# Patient Record
Sex: MALE | Race: WHITE | Employment: UNEMPLOYED | URBAN - NONMETROPOLITAN AREA
[De-identification: names, ages, dates, MRNs, and addresses within clinical notes are randomized per-mention and may not be internally consistent; named-entity substitution may affect disease eponyms.]

---

## 2020-09-20 ENCOUNTER — HOSPITAL ENCOUNTER (INPATIENT)
Age: 25
LOS: 4 days | Discharge: HOME | DRG: 753 | End: 2020-09-24
Attending: PSYCHIATRY & NEUROLOGY | Admitting: PSYCHIATRY & NEUROLOGY
Payer: COMMERCIAL

## 2020-09-20 PROCEDURE — 1240000000 HC EMOTIONAL WELLNESS R&B

## 2020-09-20 RX ORDER — POLYETHYLENE GLYCOL 3350 17 G/17G
17 POWDER, FOR SOLUTION ORAL DAILY PRN
Status: DISCONTINUED | OUTPATIENT
Start: 2020-09-20 | End: 2020-09-24 | Stop reason: HOSPADM

## 2020-09-20 RX ORDER — ACETAMINOPHEN 325 MG/1
650 TABLET ORAL EVERY 4 HOURS PRN
Status: DISCONTINUED | OUTPATIENT
Start: 2020-09-20 | End: 2020-09-24 | Stop reason: HOSPADM

## 2020-09-20 ASSESSMENT — SLEEP AND FATIGUE QUESTIONNAIRES
DO YOU USE A SLEEP AID: NO
DO YOU HAVE DIFFICULTY SLEEPING: NO
AVERAGE NUMBER OF SLEEP HOURS: 14

## 2020-09-20 ASSESSMENT — LIFESTYLE VARIABLES: HISTORY_ALCOHOL_USE: YES

## 2020-09-20 ASSESSMENT — PATIENT HEALTH QUESTIONNAIRE - PHQ9: SUM OF ALL RESPONSES TO PHQ QUESTIONS 1-9: 14

## 2020-09-20 NOTE — PROGRESS NOTES
BHI Admission From ED  Nursing Admission Note              There is no problem list on file for this patient. Pt transferred from 100 [facility], [address] [facility] hospital care to Adult [facility] room 0625A/625A-01. Arrived on unit via [facility] with . Pt  attired in street clothes. Body assessment and safety search completed by Azra Zuleta with no contraband discovered. All tubes, lines, and drains were appropriately discontinued by ED staff prior to pt transfer to [facility]. Pt belongings and valuables inventoried and cataloged, stored per policy. Pt oriented to surroundings, program expectations, and copy pt rights given. Received admit packet: [facility], Visitation Info, Fall Prevention, Restraints Info. Consents reviewed, signed Pt Rights, Handbook Acceptance, Visit/Call Acceptance, PHI Release, Social Info Release, and Treatment Agreement. Pt verbalizes understanding. Pt is a smoker? no Pt offered Nicotine patch yes,   Pt refused Nicotine patch? Yes. Identifies stressors.  Financial and social.         C/o:    Notes:

## 2020-09-21 PROBLEM — F40.10 SOCIAL ANXIETY DISORDER: Status: ACTIVE | Noted: 2020-09-21

## 2020-09-21 PROBLEM — F31.9 BIPOLAR DEPRESSION (HCC): Status: ACTIVE | Noted: 2020-09-21

## 2020-09-21 PROCEDURE — 1240000000 HC EMOTIONAL WELLNESS R&B

## 2020-09-21 PROCEDURE — 6370000000 HC RX 637 (ALT 250 FOR IP): Performed by: NURSE PRACTITIONER

## 2020-09-21 PROCEDURE — 90792 PSYCH DIAG EVAL W/MED SRVCS: CPT | Performed by: NURSE PRACTITIONER

## 2020-09-21 RX ORDER — VENLAFAXINE HYDROCHLORIDE 75 MG/1
75 CAPSULE, EXTENDED RELEASE ORAL
Status: DISCONTINUED | OUTPATIENT
Start: 2020-09-22 | End: 2020-09-21

## 2020-09-21 RX ORDER — LEVOTHYROXINE SODIUM 0.05 MG/1
50 TABLET ORAL
COMMUNITY
Start: 2020-02-17

## 2020-09-21 RX ORDER — DIVALPROEX SODIUM 500 MG/1
500 TABLET, DELAYED RELEASE ORAL 2 TIMES DAILY
COMMUNITY

## 2020-09-21 RX ORDER — LEVOTHYROXINE SODIUM 0.05 MG/1
50 TABLET ORAL
Status: DISCONTINUED | OUTPATIENT
Start: 2020-09-22 | End: 2020-09-24 | Stop reason: HOSPADM

## 2020-09-21 RX ORDER — ESCITALOPRAM OXALATE 20 MG/1
20 TABLET ORAL DAILY
Status: ON HOLD | COMMUNITY
End: 2020-09-24 | Stop reason: HOSPADM

## 2020-09-21 RX ORDER — VENLAFAXINE HYDROCHLORIDE 37.5 MG/1
37.5 CAPSULE, EXTENDED RELEASE ORAL
Status: DISCONTINUED | OUTPATIENT
Start: 2020-09-22 | End: 2020-09-23

## 2020-09-21 RX ORDER — DIVALPROEX SODIUM 500 MG/1
500 TABLET, DELAYED RELEASE ORAL 2 TIMES DAILY
Status: DISCONTINUED | OUTPATIENT
Start: 2020-09-21 | End: 2020-09-24 | Stop reason: HOSPADM

## 2020-09-21 RX ORDER — BUSPIRONE HYDROCHLORIDE 15 MG/1
30 TABLET ORAL 2 TIMES DAILY
Status: DISCONTINUED | OUTPATIENT
Start: 2020-09-21 | End: 2020-09-24 | Stop reason: HOSPADM

## 2020-09-21 RX ORDER — BUSPIRONE HYDROCHLORIDE 15 MG/1
30 TABLET ORAL 2 TIMES DAILY
COMMUNITY

## 2020-09-21 RX ADMIN — LURASIDONE HYDROCHLORIDE 120 MG: 40 TABLET, FILM COATED ORAL at 13:37

## 2020-09-21 RX ADMIN — BUSPIRONE HYDROCHLORIDE 30 MG: 15 TABLET ORAL at 21:16

## 2020-09-21 RX ADMIN — BUSPIRONE HYDROCHLORIDE 30 MG: 15 TABLET ORAL at 13:37

## 2020-09-21 RX ADMIN — DIVALPROEX SODIUM 500 MG: 500 TABLET, DELAYED RELEASE ORAL at 13:37

## 2020-09-21 RX ADMIN — DIVALPROEX SODIUM 500 MG: 500 TABLET, DELAYED RELEASE ORAL at 21:16

## 2020-09-21 NOTE — PLAN OF CARE
Problem: Health Maintenance - Impaired:  Goal: Able to sleep without medication for appropriate length of time  9/21/2020 1626 by John Saavedra  Outcome: Ongoing      Group Therapy Note     Date: 9/21/2020  Start Time: 3138  End Time:  1600  Number of Participants: 6     Type of Group: Recovery     Wellness Binder Information  Module Name:  relapse prevention  Session Number:  2     Patient's Goal:  identifying early warning signs     Notes:  pt acknowledged negative thinking as an early warning sign to help prevent relapse.      Status After Intervention:  Improved     Participation Level: Interactive     Participation Quality: Appropriate, Attentive, and Sharing        Speech:  normal        Thought Process/Content: Logical        Affective Functioning: Congruent        Mood: congruent        Level of consciousness:  Alert, Oriented x4, and Attentive        Response to Learning: Able to verbalize current knowledge/experience        Endings: None Reported     Modes of Intervention: Education        Discipline Responsible: Psychoeducational Specialist        Signature:  John Saavedra

## 2020-09-21 NOTE — PROGRESS NOTES
Group Therapy Note        Date: 9/20/20  Start Time: 2000  End Time:  2130    Wrap-up Group Session:  Patient did not participate in wrap-up group session, nurse notified and staff attempted to encourage patient participation without success.       Discipline Responsible: Behavioral Health Tech      Signature:  Dulce Tao
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
## 2020-09-21 NOTE — PROGRESS NOTES
Group Therapy Note    Start Time: 990  End Time:  594  Number of Participants: 10    Type of Group: Community Meeting       Patient's Goal:  depression      Notes:      Participation Level:  Active Listener       Participation Quality: Appropriate      Thought Process/Content: Logical      Affective Functioning: Congruent      Mood: calm      Level of consciousness:  Alert      Modes of Intervention: Support      Discipline Responsible: Behavioral Health Tech II      Signature:  Sallie Dancer

## 2020-09-21 NOTE — PLAN OF CARE
Group Therapy Note    Date: 9/21/2020  Start Time: 1000  End Time:  4738  Number of Participants: 9    Type of Group: Psychoeducation    Wellness Binder Information  Module Name:  Men's Issues  Session Number:  1    Group Goal for Pt: To improve knowledge of effective stress management techniques    Notes:  Pt demonstrated improved knowledge of effective stress management techniques by actively participating in group discussion. Status After Intervention:  Unchanged    Participation Level:  Active Listener and Interactive    Participation Quality: Appropriate and Attentive      Speech:  normal      Thought Process/Content: Logical      Affective Functioning: Congruent      Mood: anxious and depressed      Level of consciousness:  Alert and Oriented x4      Response to Learning: Able to verbalize current knowledge/experience, Able to verbalize/acknowledge new learning, and Progressing to goal      Endings: None Reported    Modes of Intervention: Education      Discipline Responsible: Psychoeducational Specialist      Signature:  Michelle Natarajan

## 2020-09-21 NOTE — PLAN OF CARE
Problem: Suicide risk  Goal: Provide patient with safe environment  9/21/2020 1619 by Shedrick Baumgarten  Outcome: Ongoing   Group Therapy Note     Date: 9/21/2020  Start Time: 1430  End Time:  4416  Number of Participants: 6     Type of Group: Cognitive Skills     Wellness Binder Information  Module Name:  emotional wellness  Session Number:  4     Patient's Goal:  self-esteem     Notes:  pt acknowledged focusing on positive to help improve self-esteem.      Status After Intervention:  Improved     Participation Level: Interactive     Participation Quality: Appropriate, Attentive, and Sharing        Speech:  normal        Thought Process/Content: Logical        Affective Functioning: Congruent        Mood: congruent        Level of consciousness:  Alert, Oriented x4, and Attentive        Response to Learning: Able to verbalize current knowledge/experience        Endings: None Reported     Modes of Intervention: Education        Discipline Responsible: Psychoeducational Specialist        Signature:  Shedrick Baumgarten

## 2020-09-21 NOTE — PROGRESS NOTES
Treatment Team Note:    MARK met with [address] team to discuss Pts [address] plans. Progress/Behavior/Group Attendance: TBD    Target Symptoms/Reason for admission: Patient admitted to [facility] due to Pt transferred from [address] [facility] [address] hospital care to [address] [place] [address] room 0625A/625A-01. Arrived on unit via [facility] with Security escort. Pt  attired in street clothes. Body assessment and safety search completed by Milan with no contraband discovered. All tubes, lines, and drains were appropriately discontinued by ED staff prior to pt transfer to [facility]    Diagnoses: Depression NOS    UDS: N/A     BAL: N/A    AftercarePlan: [address] lives with: MARK will meet with pt to gather information. Collateral obtained from: SW will meet with pt to gather release of information.   On:    Family Session: DAVION    Misc:

## 2020-09-21 NOTE — PROGRESS NOTES
Requirement Note     SW met with pt to complete Psychosocial and CSSR-S on this date. Patients long and short term goals discussed. Pt voiced understanding. Treatment plan sheet signed. Pt verbalized understanding of the treatment plan. Pt participated in goals and objectives of the treatment plan. Pt completed safety plan with , pt received copy of plan, and original was placed into pt's chart. SW explained treatment goals with pt. Decreasing depression and anxiety by improvement of positive coping patterns was discussed. Pt acknowledged understanding of treatment goals and signed treatment plan signature sheet. In the last 6 months has the pt been a danger to self: YES  In the last 6 months has the pt been a danger to others: NO  Legal Guardian/POA: NO     Provided pt with [employer] handout entitled \"Quitting Smoking. \"  Reviewed handout with pt addressing dangers of smoking, developing coping skills, and providing basic information about quitting. Patient declined practical counseling of tobacco practical counseling during the hospital stay.

## 2020-09-21 NOTE — PROGRESS NOTES
Admission Note      Reason for admission/Target Symptom: Patient admitted to [facility] due to Pt transferred from [facility], [address] [facility] care to Adult [facility] room 0625A/625A-01. Arrived on unit via [facility] with . Pt  attired in street clothes. Body assessment and safety search completed by Gold Garcia with no contraband discovered. All tubes, lines, and drains were appropriately discontinued by ED staff prior to pt transfer to [facility]  Diagnoses: Depression NOS  UDS: N/A  BAL:  N/A    SW met with treatment team to discuss patient's treatment including care planning, discharge planning, and follow-up needs. Pt has been admitted to [facility]. Treatment team has identified patient's discharge needs as medication management and outpatient therapy/counseling. Pt confirmed  the need for ongoing treatment post inpatient stay. Pt was also provided a handout of contact information for drug and alcohol treatment centers and other community support service such as [facility]ANCE, AA, and [facility].

## 2020-09-21 NOTE — PROGRESS NOTES
[facility] Adult Unit Daily Assessment  Nursing Progress Note    Room: [facility]625A-01   Name: Melita Melendez   Age: 30 y.o. Gender: male   Dx: <principal problem not specified>  Precautions: suicide risk  Inpatient Status: voluntary       SLEEP:    Sleep Quality Good  Sleep Medications: No   PRN Sleep Meds: No       MEDICAL:    Other PRN Meds: No   Med Compliant: n/a  Accu-Chek: No  Oxygen/CPAP/BiPAP: No  CIWA/CINA: No   PAIN Assessment: none  Side Effects from medication: No      PSYCH:    Depression: 0   Anxiety: 0   SI denies suicidal ideation   HI Negative for homicidal ideation      AVH:Absent      GENERAL:    Appetite: good    Social: Yes   Speech: normal   Appearance: appropriately dressed and inappropriately groomed    GROUP:    Group Participation: Yes  Participation Quality: Minimal    Notes: Pt social in day area this evening. Pt went to bed at 2100. Pt pleasant & calm. No c/o's voiced or noted. Will monitor via 15 minute rounds.       Electronically signed by Audrene Holter, RN on 9/20/2020 at 11:17 PM

## 2020-09-22 LAB
ALBUMIN SERPL-MCNC: 4.1 G/DL (ref 3.5–5.2)
ALP BLD-CCNC: 58 U/L (ref 40–130)
ALT SERPL-CCNC: 18 U/L (ref 5–41)
ANION GAP SERPL CALCULATED.3IONS-SCNC: 14 MMOL/L (ref 7–19)
AST SERPL-CCNC: 14 U/L (ref 5–40)
BILIRUB SERPL-MCNC: 0.3 MG/DL (ref 0.2–1.2)
BUN BLDV-MCNC: 14 MG/DL (ref 6–20)
CALCIUM SERPL-MCNC: 9.2 MG/DL (ref 8.6–10)
CHLORIDE BLD-SCNC: 101 MMOL/L (ref 98–111)
CO2: 27 MMOL/L (ref 22–29)
CREAT SERPL-MCNC: 1.1 MG/DL (ref 0.5–1.2)
GFR AFRICAN AMERICAN: >59
GFR NON-AFRICAN AMERICAN: >60
GLUCOSE BLD-MCNC: 71 MG/DL (ref 74–109)
HCT VFR BLD CALC: 46.1 % (ref 42–52)
HEMOGLOBIN: 15.6 G/DL (ref 14–18)
MCH RBC QN AUTO: 30.2 PG (ref 27–31)
MCHC RBC AUTO-ENTMCNC: 33.8 G/DL (ref 33–37)
MCV RBC AUTO: 89.2 FL (ref 80–94)
PDW BLD-RTO: 13.2 % (ref 11.5–14.5)
PLATELET # BLD: 192 K/UL (ref 130–400)
PMV BLD AUTO: 9 FL (ref 9.4–12.4)
POTASSIUM SERPL-SCNC: 4.3 MMOL/L (ref 3.5–5)
RBC # BLD: 5.17 M/UL (ref 4.7–6.1)
SODIUM BLD-SCNC: 142 MMOL/L (ref 136–145)
TOTAL PROTEIN: 6.8 G/DL (ref 6.6–8.7)
TSH REFLEX FT4: 1.97 UIU/ML (ref 0.35–5.5)
VITAMIN B-12: 615 PG/ML (ref 211–946)
VITAMIN D 25-HYDROXY: 20.6 NG/ML
WBC # BLD: 7 K/UL (ref 4.8–10.8)

## 2020-09-22 PROCEDURE — 99231 SBSQ HOSP IP/OBS SF/LOW 25: CPT | Performed by: NURSE PRACTITIONER

## 2020-09-22 PROCEDURE — 82607 VITAMIN B-12: CPT

## 2020-09-22 PROCEDURE — 1240000000 HC EMOTIONAL WELLNESS R&B

## 2020-09-22 PROCEDURE — 82306 VITAMIN D 25 HYDROXY: CPT

## 2020-09-22 PROCEDURE — 84443 ASSAY THYROID STIM HORMONE: CPT

## 2020-09-22 PROCEDURE — 85027 COMPLETE CBC AUTOMATED: CPT

## 2020-09-22 PROCEDURE — 6370000000 HC RX 637 (ALT 250 FOR IP): Performed by: NURSE PRACTITIONER

## 2020-09-22 PROCEDURE — 36415 COLL VENOUS BLD VENIPUNCTURE: CPT

## 2020-09-22 PROCEDURE — 80053 COMPREHEN METABOLIC PANEL: CPT

## 2020-09-22 RX ORDER — ERGOCALCIFEROL 1.25 MG/1
50000 CAPSULE ORAL WEEKLY
Status: DISCONTINUED | OUTPATIENT
Start: 2020-09-29 | End: 2020-09-24 | Stop reason: HOSPADM

## 2020-09-22 RX ORDER — ERGOCALCIFEROL 1.25 MG/1
50000 CAPSULE ORAL WEEKLY
Status: DISCONTINUED | OUTPATIENT
Start: 2020-09-22 | End: 2020-09-22

## 2020-09-22 RX ADMIN — BUSPIRONE HYDROCHLORIDE 30 MG: 15 TABLET ORAL at 20:41

## 2020-09-22 RX ADMIN — LURASIDONE HYDROCHLORIDE 120 MG: 40 TABLET, FILM COATED ORAL at 07:55

## 2020-09-22 RX ADMIN — LEVOTHYROXINE SODIUM 50 MCG: 50 TABLET ORAL at 06:27

## 2020-09-22 RX ADMIN — DIVALPROEX SODIUM 500 MG: 500 TABLET, DELAYED RELEASE ORAL at 07:55

## 2020-09-22 RX ADMIN — VENLAFAXINE HYDROCHLORIDE 37.5 MG: 37.5 CAPSULE, EXTENDED RELEASE ORAL at 07:55

## 2020-09-22 RX ADMIN — BUSPIRONE HYDROCHLORIDE 30 MG: 15 TABLET ORAL at 07:56

## 2020-09-22 RX ADMIN — DIVALPROEX SODIUM 500 MG: 500 TABLET, DELAYED RELEASE ORAL at 20:41

## 2020-09-22 NOTE — PROGRESS NOTES
[facility] Adult Unit Daily Assessment  Nursing Progress Note    Room: [address] [place] [address]   Name: Reese Alexis   Age: 30 y.o. Gender: male   Dx: Bipolar depression ([address]  Precautions: suicide risk  Inpatient Status: voluntary       SLEEP:    Sleep Quality Good  Sleep Medications: No   PRN Sleep Meds: No       MEDICAL:    Other PRN Meds: No   Med Compliant: Yes  Accu-Chek: No  Oxygen/CPAP/BiPAP: No  CIWA/CINA: No   PAIN Assessment: none  Side Effects from medication: No      PSYCH:    Depression: 0   Anxiety: 0   SI denies suicidal ideation   HI Negative for homicidal ideation      AVH:Absent      GENERAL:    Appetite: good    Social: No   Speech: normal   Appearance: appropriately dressed and appropriately groomed    GROUP:    Group Participation: No  Participation Quality: None    Notes: Pt stayed in his room all evening. Pt pleasant, calm & med compliant. Pt has been asleep since 2200. Will continue to monitor via 15 minute rounds.         Electronically signed by Jose Eduardo Ewing RN on 9/22/2020 at 12:38 AM

## 2020-09-22 NOTE — PLAN OF CARE
Group Therapy Note    Date: 9/22/2020  Start Time: 1100  End Time:  7751  Number of Participants: 5    Type of Group: Cognitive Skills    Wellness Binder Information  Module Name:  Women's Issues  Session Number:  4    Group Goal for Pt: To raise awareness of how thoughts influence feelings    Notes:  Pt did not attend group discussion. Pt was invited/encouraged. Status After Intervention:      Participation Level:     Participation Quality:       Speech:         Thought Process/Content:       Affective Functioning:       Mood:       Level of consciousness:        Response to Learning:       Endings:     Modes of Intervention:       Discipline Responsible:       Signature:  Larry Gardner

## 2020-09-22 NOTE — PROGRESS NOTES
Progress Note  Rafi Tello  9/22/2020 3:43 PM  Subjective:   Admit Date:   9/20/2020      CC/ADMIT DX:       Interval History:   Reviewed overnight events and nursing notes. No new physical complaints. I have reviewed all labs/diagnostics from the last 24hrs. ROS:   I have done a 10 point ROS and all are negative, except what is mentioned in the HPI. DIET GENERAL;    Medications:      vitamin D  50,000 Units Oral Weekly    levothyroxine  50 mcg Oral QAM AC    busPIRone  30 mg Oral BID    divalproex  500 mg Oral BID    lurasidone  120 mg Oral Daily    venlafaxine  37.5 mg Oral Daily with breakfast           Objective:   Vitals: /64   Pulse 73   Temp 97.9 °F (36.6 °C) (Temporal)   Resp 18   SpO2 99%  No intake or output data in the 24 hours ending 09/22/20 1543  General appearance: alert and cooperative with exam  Extremities: extremities normal, atraumatic, no cyanosis or edema  Neurologic:  No obvious focal neurologic deficits. Skin: no rashes    Assessment and Plan:   Principal Problem:    Bipolar depression ([address]  Active Problems:    Social anxiety disorder  Resolved Problems:    * No resolved hospital problems. *    Vit D Def    Plan:  1. Continue present medication(s)   2. Replace Vit D  3. Follow with Psych      Discharge planning:    home     Reviewed treatment plans with the patient and/or family.              Electronically signed by Nash Amador MD on 9/22/2020 at 3:43 PM

## 2020-09-22 NOTE — PROGRESS NOTES
Group Therapy Note    Start Time: 800  End Time:  267  Number of Participants: 10    Type of Group: Community Meeting       Patient's Goal:  \"Depression + anxiety\"      Notes:        Participation Level:  Active Listener       Participation Quality: Appropriate      Thought Process/Content: Logical      Affective Functioning: Congruent      Mood: Calm      Level of consciousness:  Alert      Modes of Intervention: Support      Discipline Responsible: Behavioral Health Tech II      Signature:  Suzanne Sanchez

## 2020-09-22 NOTE — GROUP NOTE
Group Therapy Note    Date: 9/22/2020    Group Start Time: 1600  Group End Time: 1700  Group Topic: Healthy Living/Wellness    [facility]    Judie Guerin RN                 Patient's Goal: medication education and adherence       Status After Intervention:  Unchanged    Participation Level: Active Listener    Participation Quality: Appropriate      Speech:  normal      Thought Process/Content: Logical      Affective Functioning: Congruent       Level of consciousness:  Alert and Oriented x4      Response to Learning: Able to verbalize current knowledge/experience      Endings: None Reported    Modes of Intervention: Education      Discipline Responsible: Registered Nurse      Signature:   Judie Guerin RN

## 2020-09-22 NOTE — PLAN OF CARE
Problem: Altered Mood, Depressive Behavior:  Goal: Able to verbalize acceptance of life and situations over which he or she has no control  9/22/2020 1558 by Radha Young  Outcome: Ongoing      Group Therapy Note     Date: 9/22/2020  Start Time: 1430  End Time:  6124  Number of Participants: 8     Type of Group: Psychotherapy     Wellness Binder Information  Module Name:  emotional wellness  Session Number:  1     Patient's Goal:  validation of feelings     Notes:  pt acknowledged to have feelings validated it may be necessary to share feelings with others.      Status After Intervention:  Improved     Participation Level: Interactive     Participation Quality: Appropriate, Attentive, and Sharing        Speech:  normal        Thought Process/Content: Logical        Affective Functioning: Congruent        Mood: congruent        Level of consciousness:  Alert, Oriented x4, and Attentive        Response to Learning: Able to verbalize current knowledge/experience        Endings: None Reported     Modes of Intervention: Education        Discipline Responsible: Psychoeducational Specialist        Signature:  Radha Young

## 2020-09-22 NOTE — PLAN OF CARE
Problem: Suicide risk  Goal: Provide patient with safe environment  Description: Provide patient with safe environment  Outcome: Ongoing     Problem: Health Maintenance - Impaired:  Goal: Able to sleep without medication for appropriate length of time  Description: Able to sleep without medication for appropriate length of time  Outcome: Ongoing  Goal: Maintenance of adequate nutrition will improve  Description: Maintenance of adequate nutrition will improve  Outcome: Ongoing     Problem: Altered Mood, Depressive Behavior:  Goal: Able to verbalize acceptance of life and situations over which he or she has no control  Description: Able to verbalize acceptance of life and situations over which he or she has no control  9/22/2020 1451 by Ashley Parrish RN  Outcome: Ongoing  9/22/2020 1321 by Awais Saravia  Outcome: Ongoing  Note:                                                                     Group Therapy Note    Date: 9/22/2020  Start Time: 1000  End Time:  4888  Number of Participants: 9    Type of Group: Psychoeducation    Wellness Binder Information  Module Name:  [address] [place] [address]  Session Number:  1    Group Goal for Pt: To improve knowledge of practical facts about depression    Notes:  Pt demonstrated improved knowledge of practical facts about depression by actively participating in group discussion. Status After Intervention:  Unchanged    Participation Level:  Active Listener    Participation Quality: Appropriate      Speech:  normal      Thought Process/Content: Logical      Affective Functioning: Congruent      Mood: anxious and depressed      Level of consciousness:  Alert and Oriented x4      Response to Learning: Able to verbalize current knowledge/experience, Able to verbalize/acknowledge new learning, and Progressing to goal      Endings: None Reported    Modes of Intervention: Education      Discipline Responsible: Psychoeducational Specialist      Signature:  Awais Saravia

## 2020-09-22 NOTE — PROGRESS NOTES
Treatment Team Note:     MARK met with [address] team to discuss Pts TX and DC plans.      Progress/Behavior/Group Attendance: TBD     Target Symptoms/Reason for admission: Patient admitted to [facility] due to Pt transferred from [facility], [address] hospital care to [address] [place] [address] room 0625A/625A-01. Arrived on unit via [facility] with Security escort. Pt  attired in street clothes. Body assessment and safety search completed by Milan with no contraband discovered. All tubes, lines, and drains were appropriately discontinued by ED staff prior to pt transfer to [facility]     Diagnoses: Bipolar Depression, Social Anxiety Disorder     UDS: N/A             BAL: N/A     AftercarePlan: [facility] lives with: SW will meet with pt to gather information.     Collateral obtained from: SW will meet with pt to gather release of information.   On:     Family Session: DAVION     Misc:

## 2020-09-22 NOTE — H&P
HISTORY and PHYSICAL      CHIEF COMPLAINT:  Depression, SI    Reason for Admission:  Depression, SI    History Obtained From:  Patient, chart    HISTORY OF PRESENT ILLNESS:      The patient is a 30 y.o. male who is admitted to the [place] [address] unit with worsening mood issues. He has no c/o CP or SOA. No abdominal pain or N/V. No dysuria. No weakness or HA. No new pain complaints. No fevers. Past Medical History:    No past medical history on file. Past Surgical History:    No past surgical history on file. Medications Prior to Admission:    Medications Prior to Admission: busPIRone (BUSPAR) 15 MG tablet, Take 30 mg by mouth 2 times daily  divalproex (DEPAKOTE) 500 MG DR tablet, Take 500 mg by mouth 2 times daily  escitalopram (LEXAPRO) 20 MG tablet, Take 20 mg by mouth daily  levothyroxine (SYNTHROID) 50 MCG tablet, Take 50 mcg by mouth every morning (before breakfast)  lurasidone (LATUDA) 120 MG tablet, Take 120 mg by mouth  VORTIoxetine (TRINTELLIX) 10 MG TABS tablet, Take 10 mg by mouth daily    Allergies:  Patient has no known allergies. Social History:   TOBACCO:   has no history on file for tobacco.  ETOH:   has no history on file for alcohol. DRUGS:   has no history on file for drug. MARITAL STATUS:  single  OCCUPATION:  Not working  Patient currently lives alone      Family History:   No family history on file.   REVIEW OF SYSTEMS:  Constitutional: neg  CV: neg  Pulmonary: neg  GI: neg  : neg  Psych: depression, SI  Neuro: neg  Skin: neg  MusculoSkeletal: neg  HEENT: neg  Joints: neg    Vitals:  /83   Pulse 85   Temp 97.8 °F (36.6 °C) (Temporal)   Resp 20   SpO2 100%     PHYSICAL EXAM:  Gen: NAD, alert  HEENT: WNL  Lymph: no LAD  Neck: no JVD or masses  Chest: CTA bilat  CV: RRR  Abdomen: NT/ND  Extrem: no C/C/E  Neuro: non focal  Skin: no rashes  Joints: no redness    DATA:  I have reviewed the admission labs and imaging

## 2020-09-22 NOTE — PLAN OF CARE
Problem: Health Maintenance - Impaired:  Goal: Able to sleep without medication for appropriate length of time  9/22/2020 1609 by Kike Jarvis  Outcome: Ongoing       Group Therapy Note     Date: 9/22/2020  Start Time: 8289  End Time:  1600  Number of Participants: 8     Type of Group: Recovery     Wellness Binder Information  Module Name:  relapse prevention  Session Number:  3     Patient's Goal:  too much stress can lead to relapse     Notes:  pt acknowledged positive coping skills to help reduce stress and prevent relapse.      Status After Intervention:  Improved     Participation Level: Interactive     Participation Quality: Appropriate, Attentive, and Sharing        Speech:  normal        Thought Process/Content: Logical        Affective Functioning: Congruent        Mood: congruent        Level of consciousness:  Alert, Oriented x4, and Attentive        Response to Learning: Able to verbalize current knowledge/experience        Endings: None Reported     Modes of Intervention: Education        Discipline Responsible: Psychoeducational Specialist        Signature:  Kike Jarvis

## 2020-09-22 NOTE — PROGRESS NOTES
[address] [place] [address]      Psychiatric Progress Note    Name:  Agustín Gill  Date:  9/22/2020  Age:  30 y.o. Sex:  male  Ethnicity:   Primary Care Physician:  No primary care provider on file. Patient Care Team:  No care team member to display  Chief Complaint: \" I am doing ok today. \"        Historian:patient  Complaint Type: anxiety, decreased appetite, depression, fatigue, loss of interest in favorite activities, mood swings and sleep disturbance  Course of Symptoms: ongoing  Precipitating Factors: history of mental illness, recent argument with friend, financial stressors         Subjective  Nursing notes were reviewed during the night and patient had no behavioral issues during the night. No prn medications were needed. This morning he denies SI, HI and psychosis at this time. He is seated in his bed currently. Reports that he is \"doing ok\" today. Did get up to eat breakfast. Remains isolative to his room with the exception of meals and groups. Was able to talk to his family on the phone last night. He reports that his family is supportive. Patient reports sleep as \"really good. \" Patient has been calm and cooperative with staff and peers. Patient has been compliant with medications. Patient has been attending groups. Patient reports appetite as \"it's fine. \" Patient reports no side effects from medications. Previous Psychiatric/Substance Use History      Medical History:  No past medical history on file. SHERIDAN History:   Social History     Substance and Sexual Activity   Alcohol Use Not on file         Social History     Substance and Sexual Activity   Drug Use Not on file        Social History     Tobacco Use   Smoking Status Not on file        Family History:     No family history on file.       Vital Signs:  Last set of tests and vitals:  Vitals:    09/21/20 2115   BP: 123/83   Pulse: 85   Resp: 20   Temp: 97.8 °F (36.6 °C)   SpO2: 100%          Mental Status:  Level of consciousness:  within normal limits and awake  Appearance:  well-appearing, street clothes, in chair, good grooming and good hygiene  Behavior/Motor:  no abnormalities noted  Attitude toward examiner:  cooperative, attentive and good eye contact  Speech:  normal rate and normal volume  Mood:  \" I am doing ok today. \"  Affect:  flat  Thought processes:  linear and goal directed  Thought content:  Homocidal ideation :denies  Suicidal Ideation:  denies suicidal ideation  Delusions:  no evidence of delusions  Perceptual Disturbance:  denies any perceptual disturbance  Cognition:  oriented to person, place, and time  Concentration : good  Memory intact for recent and remote  Fund of knowledge:  average  Abstract thinking:  adequate  Insight: good  Judgment:  good     levothyroxine  50 mcg Oral QAM AC    busPIRone  30 mg Oral BID    divalproex  500 mg Oral BID    lurasidone  120 mg Oral Daily    venlafaxine  37.5 mg Oral Daily with breakfast       Current Medications:  Current Facility-Administered Medications   Medication Dose Route Frequency Provider Last Rate Last Dose    levothyroxine (SYNTHROID) tablet 50 mcg  50 mcg Oral QAM AC Sally Gonzales, APRN   50 mcg at 09/22/20 4005    busPIRone (BUSPAR) tablet 30 mg  30 mg Oral BID Jian Ortega, APRN   30 mg at 09/22/20 0756    divalproex (DEPAKOTE) DR tablet 500 mg  500 mg Oral BID Jian Ortega APRN   500 mg at 09/22/20 0755    lurasidone (LATUDA) tablet 120 mg  120 mg Oral Daily Jian Ortega, APRN   120 mg at 09/22/20 0755    venlafaxine (EFFEXOR XR) extended release capsule 37.5 mg  37.5 mg Oral Daily with breakfast Jian Ortega, APRN   37.5 mg at 09/22/20 0755    acetaminophen (TYLENOL) tablet 650 mg  650 mg Oral Q4H PRN Anthony Lara MD        polyethylene glycol (GLYCOLAX) packet 17 g  17 g Oral Daily PRN Anthony Lara MD           Psychotherapy:   SUPPORTIVE    DSM V Diagnoses:    Principal Problem:    Bipolar depression [facility]  Active Problems:    Social anxiety disorder  Resolved Problems:    * No resolved hospital problems. *            Plan:    Encourage group therapy  15 minute safety checks  Medical monitoring by Dr. Trude Soulier and associates  Continue current therapy and medications    Amount of time spent with patient:  15 minutes with greater than 50% of the time spent in counseling and collaboration of care.     DUSTIN oMnreal  Clinician Signature: signed electronically

## 2020-09-22 NOTE — PROGRESS NOTES
BHI Daily Shift Assessment  Nursing Progress Note    Room: [facility]625A- Name: Rafi Tello Age: 30 y.o.    Ethnicity:  Gender: male   Dx: Bipolar depression ([address]  Precautions: suicide risk  CPAP: No Accu-Chek: No  MSE:  Status and Exam  Normal: No  Facial Expression: Brightened  Affect: Appropriate  Level of Consciousness: Alert  Mood:Normal: Yes  Mood: Anxious, Depressed  Motor Activity:Normal: Yes  Interview Behavior: Cooperative  Preception: [place] to Person, [place]  to Situation, [place] to Time, [place] to Place  Attention:Normal: Yes  Thought Processes: Circumstantial  Thought Content:Normal: Yes  Thought Content: Other(See Comment)(Denies SI/HI)  Hallucinations: None  Delusions: No  Memory:Normal: Yes  Insight and Judgment: No  Insight and Judgment: Poor Insight, Poor Judgment  Present Suicidal Ideation: No  Present Homicidal Ideation: No  Sleep: Yes, Good, no sleep issues Hours Slept: 8 Sched Sleep Meds: Yes PRN Sleep Meds: Yes Other PRN Meds: Yes Med Compliant: Yes Appetite: good Percent Meals: 100% Social: Yes ADLs: Yes Speech: normal Depression: 3 Anxiety: 3    Hailee Barrera RN

## 2020-09-23 LAB — SARS-COV-2, NAAT: NOT DETECTED

## 2020-09-23 PROCEDURE — 6370000000 HC RX 637 (ALT 250 FOR IP): Performed by: NURSE PRACTITIONER

## 2020-09-23 PROCEDURE — 99231 SBSQ HOSP IP/OBS SF/LOW 25: CPT | Performed by: NURSE PRACTITIONER

## 2020-09-23 PROCEDURE — 1240000000 HC EMOTIONAL WELLNESS R&B

## 2020-09-23 PROCEDURE — U0002 COVID-19 LAB TEST NON-CDC: HCPCS

## 2020-09-23 RX ORDER — VENLAFAXINE HYDROCHLORIDE 75 MG/1
75 CAPSULE, EXTENDED RELEASE ORAL
Status: DISCONTINUED | OUTPATIENT
Start: 2020-09-24 | End: 2020-09-24 | Stop reason: HOSPADM

## 2020-09-23 RX ADMIN — LURASIDONE HYDROCHLORIDE 120 MG: 40 TABLET, FILM COATED ORAL at 08:06

## 2020-09-23 RX ADMIN — DIVALPROEX SODIUM 500 MG: 500 TABLET, DELAYED RELEASE ORAL at 08:06

## 2020-09-23 RX ADMIN — VENLAFAXINE HYDROCHLORIDE 37.5 MG: 37.5 CAPSULE, EXTENDED RELEASE ORAL at 08:06

## 2020-09-23 RX ADMIN — LEVOTHYROXINE SODIUM 50 MCG: 50 TABLET ORAL at 06:28

## 2020-09-23 RX ADMIN — DIVALPROEX SODIUM 500 MG: 500 TABLET, DELAYED RELEASE ORAL at 21:27

## 2020-09-23 RX ADMIN — BUSPIRONE HYDROCHLORIDE 30 MG: 15 TABLET ORAL at 08:06

## 2020-09-23 RX ADMIN — BUSPIRONE HYDROCHLORIDE 30 MG: 15 TABLET ORAL at 21:27

## 2020-09-23 NOTE — PLAN OF CARE
Group Therapy Note     Date: 9/23/2020  Start Time: 1100  End Time:  5657  Number of Participants: 7     Type of Group: Psychoeducation     Wellness Binder Information  Module Name:  staying well  Session Number:  1     Patient's Goal:  daily maintenance and coping skills     Notes:  pt was verbally prompted to attend group. Pt refused. Information about coping skills was provided. Status After Intervention:       Participation Level:      Participation Quality:         Speech:           Thought Process/Content:         Affective Functioning:         Mood:         Level of consciousness:          Response to Learning:         Endings:      Modes of Intervention:         Discipline Responsible: Psychoeducational Specialist        Signature:  Angle Humphries

## 2020-09-23 NOTE — PROGRESS NOTES
[facility] Adult Unit Daily Assessment  Nursing Progress Note    Room: [address] [place], [place] [address]A-   Name: Sol De Leon   Age: 30 y.o. Gender: male   Dx: Bipolar depression ([address]  Precautions: suicide risk  Inpatient Status: voluntary       SLEEP:    Sleep Quality Good  Sleep Medications: No   PRN Sleep Meds: No       MEDICAL:    Other PRN Meds: No   Med Compliant: Yes  Accu-Chek: No  Oxygen/CPAP/BiPAP: No  CIWA/CINA: No   PAIN Assessment: none  Side Effects from medication: No      PSYCH:    Depression: 0   Anxiety: 0   SI denies suicidal ideation   HI Negative for homicidal ideation      AVH:Absent      GENERAL:    Appetite: good    Social: No   Speech: normal   Appearance: appropriately dressed and appropriately groomed      Notes: Pt not social this evening. Pt stayed in his room, in bed all evening. Pt pleasant, calm & med compliant. Will monitor via 15 minute rounds.         Electronically signed by Guera Dukes RN on 9/22/2020 at 10:19 PM

## 2020-09-23 NOTE — PROGRESS NOTES
[address] [place] [address]      Psychiatric Progress Note    Name:  Chiqui Beck  Date:  9/23/2020  Age:  30 y.o. Sex:  male  Ethnicity:   Primary Care Physician:  No primary care provider on file. Patient Care Team:  No care team member to display  Chief Complaint: \" I am doing ok today. \"        Historian:patient  Complaint Type: anxiety, depression, loss of interest in favorite activities, sleep disturbance and tobacco use  Course of Symptoms: improved  Precipitating Factors: history of mental illness        Subjective  Nursing notes were reviewed and patient had no behavioral issues during the night. No PRN medications were administered during the night. Today he reports that he is \"feeling better. \" He has been more social with peers on the unit. Reports that he has been enjoying group therapy and feel that is has helped him open up with other peers. Patient reports sleep as \"it was good. \"  Patient has been calm and cooperative with staff and peers. Patient has been compliant with medications. Patient has been attending groups. Patient reports appetite as \"it's better. \"            Patient reports no side effects from medications. Previous Psychiatric/Substance Use History      Medical History:  No past medical history on file. SHERIDAN History:   Social History     Substance and Sexual Activity   Alcohol Use Not on file         Social History     Substance and Sexual Activity   Drug Use Not on file        Social History     Tobacco Use   Smoking Status Not on file        Family History:     No family history on file.       Vital Signs:  Last set of tests and vitals:  Vitals:    09/22/20 2115   BP: 124/82   Pulse: 86   Resp: 20   Temp: 97.9 °F (36.6 °C)   SpO2: 100%          Mental Status:  Level of consciousness:  within normal limits and awake  Appearance:  well-appearing, street clothes, in chair, good grooming and good hygiene  Behavior/Motor:  no abnormalities noted  Attitude Bipolar depression ([facility]  Active Problems:    Social anxiety disorder  Resolved Problems:    * No resolved hospital problems. *            Plan:    Encourage group therapy  15 minute safety checks  Medical monitoring by Dr. Escobar Guerin and associates  Continue current therapy and medications  Will increase Venlafaxine to 75 mg po daily  Possible discharge tomorrow     Amount of time spent with patient:  15 minutes with greater than 50% of the time spent in counseling and collaboration of care.     DUSTIN Barkley  Clinician Signature: signed electronically

## 2020-09-23 NOTE — PROGRESS NOTES
Collateral obtained from: patients andrew Barnhart [phone]    Immediate Stressors & Time Episode Began: Next month is the anniversary of his brother passing away and he was feeling very upset. Patient is seeing a therapist in [address] but she has never got his medication ready. Patient had to go a month without his medication and he once showed up and she wasn't even there. Patient recently moved out in April and is 45 mins from his mom. Patient will come often to visit. Diagnosis/Hx of compliance with meds: Taking medication as directed    Tx Hx/Past hospitalizations:  First admisson    Family hx of psychiatric issues: No issues    Substance Abuse: No issues    Pending Legal: No issues    Safety Issues (Weapons? Hx of attempts): No weapons    Support system/Medication Managed by: The importance of medication management and locking extra medication in a secured location was explained and reccommended to collateral.     Additional Info: Patient currently lives alone.

## 2020-09-23 NOTE — PROGRESS NOTES
Treatment Team Note:     MARK met with [address] team to discuss Pts TX and DC plans.      Progress/Behavior/Group Attendance: TBD     Target Symptoms/Reason for admission: Patient admitted to [facility] due to Pt transferred from [facility], [address] hospital care to [address] [place] [address] room 0625A/625A-01. Arrived on unit via [facility] with Security escort. Pt  attired in street clothes. Body assessment and safety search completed by Milan with no contraband discovered. All tubes, lines, and drains were appropriately discontinued by ED staff prior to pt transfer to [address] [place] [address] Depression, Social Anxiety Disorder     UDS: N/A             BAL: N/A     [facility]     Pt lives with: SW will meet with pt to gather information.     Collateral obtained from: SW will meet with pt to gather release of information.   On:     Family Session: DAVION     Misc:

## 2020-09-23 NOTE — PROGRESS NOTES
BHI Daily Shift Assessment  Nursing Progress Note    Room: [facility]/625[address]-01 Name: Chiqui Beck Age: 30 y.o. Gender: male   Dx: Bipolar depression ([address]  Precautions: suicide risk  Target Symptoms:   Accu-Chek: NoSleep: Yes,Sleep Quality Good SI No AVH denies [address] [place] [address]  ADLs: Yes Speech: normal Depression: 2 Anxiety: 3   Participation LevelActive Listener and Interactive  Appetite: Good  Visitation: No   Participation QualityAppropriate, Attentive, Sharing and Supportive    Notes: alert and oriented x4. Pleasant,calm, and cooperative. Appearance and attire is clean and appropriate. Well groomed. Thought processes are linear and goal directed. Social and attending groups. Compliant with medications. Reports good sleep. Currently in day area socializing and playing cards with peers.      Signature: Electronically signed by Josh Brown RN on 9/23/20 at 6:21 PM CDT

## 2020-09-23 NOTE — PROGRESS NOTES
Group Therapy Note    Start Time: 800  End Time:  373  Number of Participants: 9    Type of Group: Community Meeting       Patient's Goal:  \"depression and anxiety\"      Notes:      Participation Level:  Active Listener       Participation Quality: Appropriate      Thought Process/Content: Logical      Affective Functioning: Congruent      Mood: calm      Level of consciousness:  Alert      Modes of Intervention: Support      Discipline Responsible: Behavioral Health Tech II      Signature:  Boyd Louise

## 2020-09-23 NOTE — BH NOTE
[address]    Signed    Date of Service:  9/23/2020  2:14 PM                Signed              Show:Clear all  []Manual[x]Template[]Copied    Added by:  [x]Favian Beard    []Luis Fernando for details                                                                      Group Therapy Note     Date: 9/23/2020  Start Time: 1330  End Time:  1400  Number of Participants: 4     Type of Group: Spirituality      Wellness Binder Information  Module Name:  Mindfulness  Session Number:       Patient's Goal:  Skills in relaxation andmeditation     Notes:       Status After Intervention:  Improved     Participation Level:  Active Listener and Interactive     Participation Quality: Appropriate, Attentive and Sharing        Speech:  normal        Thought Process/Content:         Affective Functioning: Congruent        Mood: euthymic, calm        Level of consciousness:  Oriented x4 and Attentive        Response to Learning: Able to verbalize current knowledge/experience and Capable of insight        Endings:      Modes of Intervention: Education and Activity        Discipline Responsible:         Signature:  Allison Ballesteros  [employer]

## 2020-09-24 VITALS
DIASTOLIC BLOOD PRESSURE: 92 MMHG | SYSTOLIC BLOOD PRESSURE: 131 MMHG | TEMPERATURE: 97 F | HEART RATE: 60 BPM | RESPIRATION RATE: 18 BRPM | OXYGEN SATURATION: 100 %

## 2020-09-24 PROCEDURE — 5130000000 HC BRIDGE APPOINTMENT

## 2020-09-24 PROCEDURE — 6370000000 HC RX 637 (ALT 250 FOR IP): Performed by: NURSE PRACTITIONER

## 2020-09-24 PROCEDURE — 99238 HOSP IP/OBS DSCHRG MGMT 30/<: CPT | Performed by: NURSE PRACTITIONER

## 2020-09-24 RX ORDER — VENLAFAXINE HYDROCHLORIDE 75 MG/1
75 CAPSULE, EXTENDED RELEASE ORAL
Qty: 14 CAPSULE | Refills: 0 | Status: SHIPPED | OUTPATIENT
Start: 2020-09-25 | End: 2020-10-09

## 2020-09-24 RX ORDER — ERGOCALCIFEROL 1.25 MG/1
50000 CAPSULE ORAL WEEKLY
Qty: 5 CAPSULE | Refills: 0 | Status: SHIPPED | OUTPATIENT
Start: 2020-09-29

## 2020-09-24 RX ADMIN — VENLAFAXINE HYDROCHLORIDE 75 MG: 75 CAPSULE, EXTENDED RELEASE ORAL at 07:50

## 2020-09-24 RX ADMIN — LURASIDONE HYDROCHLORIDE 120 MG: 40 TABLET, FILM COATED ORAL at 07:51

## 2020-09-24 RX ADMIN — LEVOTHYROXINE SODIUM 50 MCG: 50 TABLET ORAL at 07:00

## 2020-09-24 RX ADMIN — BUSPIRONE HYDROCHLORIDE 30 MG: 15 TABLET ORAL at 07:51

## 2020-09-24 RX ADMIN — DIVALPROEX SODIUM 500 MG: 500 TABLET, DELAYED RELEASE ORAL at 07:50

## 2020-09-24 NOTE — PROGRESS NOTES
[facility] Adult Unit Daily Assessment  Nursing Progress Note    Room: [facility]625A-01   Name: Jose Moreno   Age: 30 y.o. Gender: male   Dx: Bipolar depression ([address]  Precautions: suicide risk  Inpatient Status: voluntary       SLEEP:    Sleep Quality Fair  Sleep Medications: No   PRN Sleep Meds: No       MEDICAL:    Other PRN Meds: No   Med Compliant: Yes  Accu-Chek: No  Oxygen/CPAP/BiPAP: No  CIWA/CINA: No   PAIN Assessment: none  Side Effects from medication: No      PSYCH:    Depression: 1   Anxiety: 2   SI denies suicidal ideation   HI Negative for homicidal ideation      AVH:Absent      GENERAL:    Appetite: good    Social: Yes   Speech: normal   Appearance: appropriately dressed, appropriately groomed and healthy looking    GROUP:    Group Participation: Yes  Participation Quality: Interactive    Notes: Endorses,\"I feel good right now. \"  Has been social with peers and staff. In milieu area until HS, playing cards and board games with peers. Shared that he stayed awake all day today hoping to improve quality of sleep tonight. Smiles at times tonight. Good eye contact during interview. Patient, calm and cooperative with staff and peers. Endorses good appetite.           Electronically signed by Amadou Jeff RN on 9/23/20 at 11:34 PM CDT

## 2020-09-24 NOTE — PROGRESS NOTES
Bridge Appointment completed: Reviewed Discharge Instructions with patient. Patient verbalizes understanding and agreement with the discharge plan using the teachback method. Pt received and verbalizes understanding of discharge instructions including follow up appointments and medication instructions. Pt denies SI, HI, and AVH at time of discharge. Pt discharged with documented belongings.     Referral for Outpatient Tobacco Cessation Counseling, upon discharge (carloz X if applicable and completed):    ( )  Hospital staff assisted patient to call Quit Line or faxed referral                                   during hospitalization                  ( )  Recognizing danger situations (included triggers and roadblocks), if not completed on admission                    ( )  Coping skills (new ways to manage stress, exercise, relaxation techniques, changing routine, distraction), if not completed on admission                                                           ( )  Basic information about quitting (benefits of quitting, techniques in how to quit, available resources, if not completed on admission  ( ) Referral for counseling faxed to [facility]   ( ) Patient refused referral  (x) Patient refused counseling  ( ) Patient refused smoking cessation medication upon discharge    Vaccinations (carloz X if applicable and completed): N/A  ( ) Patient states already received influenza vaccine elsewhere  ( ) Patient received influenza vaccine during this hospitalization  ( ) Patient refused influenza vaccine at this time

## 2020-09-24 NOTE — PROGRESS NOTES
Progress Note  Christina Parish  9/24/2020 6:52 AM  Subjective:   Admit Date:   9/20/2020      CC/ADMIT DX:       Interval History:   Reviewed overnight events and nursing notes. He has no medical complaints. I have reviewed all labs/diagnostics from the last 24hrs. ROS:   I have done a 10 point ROS and all are negative, except what is mentioned in the HPI. DIET GENERAL;    Medications:      venlafaxine  75 mg Oral Daily with breakfast    [START ON 9/29/2020] vitamin D  50,000 Units Oral Weekly    levothyroxine  50 mcg Oral QAM AC    busPIRone  30 mg Oral BID    divalproex  500 mg Oral BID    lurasidone  120 mg Oral Daily           Objective:   Vitals: BP (!) 120/54   Pulse 88   Temp 98.5 °F (36.9 °C) (Temporal)   Resp 16   SpO2 100%  No intake or output data in the 24 hours ending 09/24/20 8981  General appearance: alert and cooperative with exam  Extremities: extremities normal, atraumatic, no cyanosis or edema  Neurologic:  No obvious focal neurologic deficits. Skin: no rashes    Assessment and Plan:   Principal Problem:    Bipolar depression ([address]  Active Problems:    Social anxiety disorder  Resolved Problems:    * No resolved hospital problems. *    Vit D Def    Plan:  1. Continue present medication(s)   2. He is medically stable. I will monitor for any changes or concerns. 3.  Follow with Psych      Discharge planning:    home     Reviewed treatment plans with the patient and/or family.              Electronically signed by Ailyn Ricci MD on 9/24/2020 at 6:52 AM

## 2020-09-24 NOTE — PROGRESS NOTES
CLINICAL PHARMACY NOTE: MEDS TO [address] Select Patient?: No  Total # of Prescriptions Filled: 2   The following medications were delivered to the patient:  · Venlafaxine ER 75 mg  · Vitamin D (ergo)  Total # of Interventions Completed: 0  Time Spent (min): 15    Additional Documentation:

## 2020-09-24 NOTE — PROGRESS NOTES
Discharge Note     Pt discharging on this date. Pt denies SI, HI, and AVH at this time. Pt reports improvement in behavior and is leaving unit in overall good condition. SW and pt discussed pt's follow up appointments and importance of attending appointments as scheduled, pt voiced understanding and agreement. Pt and SW also discussed pt safety plan and pt able to verbally identify: warning signs, coping strategies, places and people that help make the pt feel better/distract negative thoughts, friends/family/agencies/professionals the pt can reach out to in a crisis, and something that is important to the pt/worth living for. Pt provided the national suicide prevention hotline number (7-799-525-225.691.7406) as well as local community behavioral health [facility]) crisis number for emergencies (2-234.733.4245).      Pt to follow up with:  Gianfranco for follow up counseling and medication management     Referral to out patient tobacco cessation counseling treatment:    Patient refused tobacco cessation counseling    SW offered to assist pt with transportation, pt reports that he will need tranportation to [facility] to get his car

## 2020-09-24 NOTE — DISCHARGE SUMMARY
since taking Buspirone he has been able to leave his house more frequently. Wants to change his outpatient psychiatrist and feels she is the reason he was not able to get disability and was denied three times. Reports noncompliance with his psychotropic medications at times. Most weeks he misses 1-2 doses of medications. Reports that he does not want to work because if he works he will lose his income based housing, food stamps and health insurance. He has not been able to work since 2016. His brother  in an [address] in  and he reports he has felt more depressed since his death. Feels that his brother is the \"luis felipe one\" because he does not have to live this life anymore. He has no will to live and finds \"no bernice in his life anymore\" with the exception of playing video games. Hospital Course:   Patient was admitted to the adult behavioral health floor and was acclimated to the floor. Labs were reviewed and physical exam was completed by Dr. Deng Howard and associates. Home medications were reconciled. LIZ was obtained and reviewed. Collateral was obtained from patients mother who is supportive. Medication changes were made and patient tolerated well with no side effects. Trintellix and Escitalopram were discontinued related to patient perceiving no benefit from those medications. He also reported that he had not been compliant with those medications because he felt they were not helping him. Venlafaxine was initiated with a discharge dose of 75 mg po daily for depressive symptoms. His vitamin d was replaced by Dr. Deng Howard. His home medications of Lurasidone and Divalproex were continued as well. Patient attended and participated in groups.  Patient was calm and cooperative with staff and peers. Patient was compliant with his medications. Patient was sleeping through the night. As hospitalization progressed his suicidal ideation resolved. He was more social with peers.  He reported that his anxiety was under better control as well. He was insightful about his future. He reported that he felt he had \"reasons to live\" now including himself and his family. Felt his medication regimen was \"working better for him. \" This patient is not suicidal, homicidal or psychotic at discharge. He does not present a danger to self or others. On the day of discharge and transfer of care, patient indicated readiness for discharge. He was not acutely manic nor agitated with no reported symptoms of psychosis. He indicated no thoughts of self-harm or harm to others. Given resolution of presenting symptoms and patient readiness for discharge and that patient agreed to follow-up with outpatient services with [facility] and the patient was discharged with a free 14 days supply of medication. He denied access to firearms or weapons. He was advised to abstain from all drugs and alcohol and to remain adherent to medication as prescribed. Number of antipsychotic medication prescribed at discharge: 1      Referral to addiction treatment: n/a    Prescription for Alcohol or Drug Disorder Medication: n/a    Prescription for Tobacco Cessation medication: n/a    If no prescriptions for Tobacco Cessation must document why: n/a    Consults: internal medication    Significant Diagnostic Studies: labs:     Lab Results   Component Value Date    WBC 7.0 09/22/2020    HGB 15.6 09/22/2020    HCT 46.1 09/22/2020    MCV 89.2 09/22/2020     09/22/2020     Lab Results   Component Value Date     09/22/2020    K 4.3 09/22/2020     09/22/2020    CO2 27 09/22/2020    BUN 14 09/22/2020    CREATININE 1.1 09/22/2020    GLUCOSE 71 09/22/2020    CALCIUM 9.2 09/22/2020      Lab Results   Component Value Date    TSHFT4 1.97 09/22/2020     Lab Results   Component Value Date    VITD25 20.6 (L) 09/22/2020     Results for Ishan Austin (MRN [id number]) as of 9/24/2020 09:52   Ref.  Range 9/22/2020 09:22   Albumin Latest Ref Range: 3.5 - 5.2 g/dL 4.1 Alk Phos Latest Ref Range: 40 - 130 U/L 58   ALT Latest Ref Range: 5 - 41 U/L 18   AST Latest Ref Range: 5 - 40 U/L 14   Bilirubin Latest Ref Range: 0.2 - 1.2 mg/dL 0.3   Total Protein Latest Ref Range: 6.6 - 8.7 g/dL 6.8     Results for Vicki Joe (MRN [id number]) as of 9/24/2020 09:52   Ref. Range 9/22/2020 09:22 9/23/2020 09:38   Vitamin B-12 Latest Ref Range: 211 - 946 pg/mL 615    COVID-19 Unknown  Rpt   SARS-CoV-2, NAAT Latest Ref Range: Not Detected   Not Detected       Treatments: therapies: RN and SW    Alert, Oriented X 4  Appearance:  Grooming and Hygiene attended to  Speech with Regular Rate and Rhythm  Eye Contact:  Good  No Psychomotor Agitation/Retardation Noted  Attitude:  Cooperative  Mood:  \"I am feeling so much better now. \"  Affective: Congruent, appropriate to the situation, with a normal range and intensity  Thought Processes:  Coherently communicated, logical and goal oriented  Thought Content:  At this time No Suicidal Ideation, No Homicidal Ideation, No Auditory or Visual   Hallucinations, No overt Delusions  Insight:  Present  Judgement:  Normal  Memory is intact for both remote and recent  Intellectual Functioning:  Within the [address] of Knowledge:  Adequate  Attention and Concentration:  Adequate        Discharge Exam:  GAIT STABLE  SPEAKS IN FULL SENTENCES WITHOUT SHORTNESS OF AIR    Disposition: home    Patient Instructions:   Current Discharge Medication List      START taking these medications    Details   venlafaxine (EFFEXOR XR) 75 MG extended release capsule Take 1 capsule by mouth daily (with breakfast) for 14 days  Qty: 14 capsule, Refills: 0      vitamin D (ERGOCALCIFEROL) 1.25 MG (76085 UT) CAPS capsule Take 1 capsule by mouth once a week  Qty: 5 capsule, Refills: 0         CONTINUE these medications which have NOT CHANGED    Details   busPIRone (BUSPAR) 15 MG tablet Take 30 mg by mouth 2 times daily      divalproex (DEPAKOTE) 500 MG DR tablet Take 500 mg by mouth 2 times daily      levothyroxine (SYNTHROID) 50 MCG tablet Take 50 mcg by mouth every morning (before breakfast)      lurasidone (LATUDA) 120 MG tablet Take 120 mg by mouth         STOP taking these medications       escitalopram (LEXAPRO) 20 MG tablet Comments:   Reason for Stopping:         VORTIoxetine (TRINTELLIX) 10 MG TABS tablet Comments:   Reason for Stopping:             Activity: activity as tolerated  Diet: regular diet  Wound Care: none needed    Follow-up with   PCP in 2 weeks.     Time worked: Less than 30 minutes    Participation:good    Electronically signed by DUSTIN Brower on 9/24/2020 at 9:36 AM

## 2020-09-24 NOTE — PROGRESS NOTES
sw placed a call to get a ride set up with patients Insurance provider to transport patient to [facility].

## 2020-09-24 NOTE — PROGRESS NOTES
BHI Daily Shift Assessment  Nursing Progress Note    Room: [facility]/[address] Name: Brenden Coleman Age: 30 y.o. Gender: male   Dx: Bipolar depression ([address]  Precautions: suicide risk  Target Symptoms:   Accu-Chek: NoSleep: Yes,Sleep Quality Fair SI No AVH denies [address] [place] [address]  ADLs: Yes Speech: normal Depression: 1 Anxiety: 1   Participation Level  Appetite: Good  Visitation:  Participation Quality    Complaints:    Notes: Pt in up in day room. Pt calm and cooperative. Pt denies SI, HI, and AVH. Pt rates depression 1 and anxiety 1. Pt reports fair sleep. Pt reports that he had some trouble falling asleep related to \"thinking about things and keeping my mind occupied\" but slept well once he fell asleep. Pt reports normal appetite. Pt is social at times. Pt goes to group and does ADLs.      Signature:   Electronically signed by Perry Barrera RN on 9/24/2020 at 9:31 AM

## 2020-09-24 NOTE — PLAN OF CARE
Group Therapy Note    Date: 9/24/2020  Start Time: 1000  End Time:  9381  Number of Participants: 5    Type of Group: Psychoeducation    Wellness Binder Information  Module Name:  Men's Issues  Session Number:  1    Group Goal for Pt: To improve knowledge of effective stress management techniques    Notes:  Pt did not attend group discussion. Pt was invited/encouraged. Status After Intervention:      Participation Level:     Participation Quality:       Speech:         Thought Process/Content:       Affective Functioning:       Mood:       Level of consciousness:        Response to Learning:       Endings:     Modes of Intervention:       Discipline Responsible:       Signature:  Kia Martin

## 2020-09-24 NOTE — PROGRESS NOTES
Group Therapy Note    Start Time: 800  End Time:  222  Number of Participants: 8    Type of Group: Community Meeting       Patient's Goal:  \"discharge\"      Notes:      Participation Level:  Active Listener       Participation Quality: Appropriate      Thought Process/Content: Logical      Affective Functioning: Congruent      Mood: calm      Level of consciousness:  Alert      Modes of Intervention: Support      Discipline Responsible: Behavioral Health Tech II      Signature:  Maday Vides

## 2020-09-24 NOTE — DISCHARGE INSTR - DIET
Eating Healthy Foods: Care Instructions  Your Care Instructions     Eating healthy foods can help lower your risk for disease. Healthy food gives you energy and keeps your heart strong, your brain active, your muscles working, and your bones strong. A healthy diet includes a variety of foods from the basic food groups: grains, vegetables, fruits, milk and milk products, and meat and beans. Some people may eat more of their favorite foods from only one food group and, as a result, miss getting the nutrients they need. So, it is important to pay attention not only to what you eat but also to what you are missing from your diet. You can eat a healthy, balanced diet by making a few small changes. Follow-up care is a key part of your treatment and safety. Be sure to make and go to all appointments, and call your doctor if you are having problems. It's also a good idea to know your test results and keep a list of the medicines you take. How can you care for yourself at home? Look at what you eat  · Keep a food diary for a week or two and record everything you eat or drink. Track the number of servings you eat from each food group. · For a balanced diet every day, eat a variety of:  ? 6 or more ounce-equivalents of grains, such as cereals, breads, crackers, rice, or pasta, every day. An ounce-equivalent is 1 slice of bread, 1 cup of ready-to-eat cereal, or ½ cup of cooked rice, cooked pasta, or cooked cereal.  ? 2½ cups of vegetables, especially:  § Dark-green vegetables such as broccoli and spinach. § Orange vegetables such as carrots and sweet potatoes. § Dry beans (such as mar and kidney beans) and peas (such as lentils). ? 2 cups of fresh, frozen, or canned fruit. A small apple or 1 banana or orange equals 1 cup. ? 3 cups of nonfat or low-fat milk, yogurt, or other milk products. ? 5½ ounces of meat and beans, such as chicken, fish, lean meat, beans, nuts, and seeds.  One egg, 1 tablespoon of peanut butter, ½ ounce nuts or seeds, or ¼ cup of cooked beans equals 1 ounce of meat. · Learn how to read food labels for serving sizes and ingredients. Fast-food and convenience-food meals often contain few or no fruits or vegetables. Make sure you eat some fruits and vegetables to make the meal more nutritious. · Look at your food diary. For each food group, add up what you have eaten and then divide the total by the number of days. This will give you an idea of how much you are eating from each food group. See if you can find some ways to change your diet to make it more healthy. Start small  · Do not try to make dramatic changes to your diet all at once. You might feel that you are missing out on your favorite foods and then be more likely to fail. · Start slowly, and gradually change your habits. Try some of the following:  ? Use whole wheat bread instead of white bread. ? Use nonfat or low-fat milk instead of whole milk. ? Eat brown rice instead of white rice, and eat whole wheat pasta instead of white-flour pasta. ? Try low-fat cheeses and low-fat yogurt. ? Add more fruits and vegetables to meals and have them for snacks. ? Add lettuce, tomato, cucumber, and onion to sandwiches. ? Add fruit to yogurt and cereal.  Enjoy food  · You can still eat your favorite foods. You just may need to eat less of them. If your favorite foods are high in fat, salt, and sugar, limit how often you eat them, but do not cut them out entirely. · Eat a wide variety of foods. Make healthy choices when eating out  · The type of restaurant you choose can help you make healthy choices. Even fast-food chains are now offering more low-fat or healthier choices on the menu. · Choose smaller portions, or take half of your meal home. · When eating out, try:  ? A veggie pizza with a whole wheat crust or grilled chicken (instead of sausage or pepperoni).   ? Pasta with roasted vegetables, grilled chicken, or marinara sauce instead of cream sauce. ? A vegetable wrap or grilled chicken wrap. ? Broiled or poached food instead of fried or breaded items. Make healthy choices easy  · Buy packaged, prewashed, ready-to-eat fresh vegetables and fruits, such as baby carrots, salad mixes, and chopped or shredded broccoli and cauliflower. · Buy packaged, presliced fruits, such as melon or pineapple. · Choose 100% fruit or vegetable juice instead of soda. Limit juice intake to 4 to 6 oz (½ to ¾ cup) a day. · Blend low-fat yogurt, fruit juice, and canned or frozen fruit to make a smoothie for breakfast or a snack. Where can you learn more? Go to https://[employer]. org and sign in to your [employer] account. Enter W248 in the v2 Ratings box to learn more about \"Eating Healthy Foods: Care Instructions. \"     If you do not have an account, please click on the \"Sign Up Now\" link. Current as of: August 22, 2019               Content Version: 12.5  © 1972-0101 [employer], [employer]orporated. Care instructions adapted under license by [facility] ([facility]). If you have questions about a medical condition or this instruction, always ask your healthcare professional. Almas Castillo any warranty or liability for your use of this information.